# Patient Record
Sex: FEMALE | HISPANIC OR LATINO | Employment: STUDENT | URBAN - METROPOLITAN AREA
[De-identification: names, ages, dates, MRNs, and addresses within clinical notes are randomized per-mention and may not be internally consistent; named-entity substitution may affect disease eponyms.]

---
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
## 2024-03-07 NOTE — PROGRESS NOTES
SUBJECTIVE:                                                   Breanna Henley is a 18 year old female who presents to clinic today for the following health issue(s):  Patient presents with:  Vaginal Problem: Symptoms: Watery yellowish discharge, light odor.      HPI:  Has had BV in September and Dec 2023. Feels she had symptoms last week, but resolved with boric acid for 3 nights. Symptoms include watery yellow discharge and odor when she has BV. No symptoms today.   Notes she had a new partner when she first started getting BV.     Started OCP due to low iron from heavy periods. Would like to check iron levels.     No new partners since 12/2023 STI screening.     Patient's last menstrual period was 02/13/2024..     Patient is not sexually active, No obstetric history on file..  Using oral contraceptives for contraception.    reports that she has never smoked. She has never used smokeless tobacco.    STD testing offered?  Declined    Health maintenance updated:  yes    Overdue          Never done YEARLY PREVENTIVE VISIT (Yearly)     Never done CHLAMYDIA SCREENING (Yearly)     Never done ADVANCE CARE PLANNING (Every 5 Years)     Never done HIV SCREENING (Once)     Never done HEPATITIS C SCREENING (Once)     SEP 14   2023 COVID-19 Vaccine (4 - 2023-24 season)  Last completed: Jul 20, 2023     Never done PHQ-2 (once per calendar year) (Yearly, January to December)       Today's PHQ-2 Score:       3/11/2024     8:07 AM   PHQ-2 ( 1999 Pf[employer])   Q1: Little interest or pleasure in doing things 0   Q2: Feeling down, depressed or hopeless 0   PHQ-2 Score 0     Today's PHQ-9 Score:       3/11/2024     8:07 AM   PHQ-9 SCORE   PHQ-9 Total Score 0     Today's MARV-7 Score:       3/11/2024     8:07 AM   AMRV-7 SCORE   Total Score 4       Problem list and histories reviewed & adjusted, as indicated.  Additional history: as documented.    There is no problem list on file for this patient.    History reviewed. No pertinent  "surgical history.   Social History     Tobacco Use    Smoking status: Never    Smokeless tobacco: Never   Substance Use Topics    Alcohol use: Never      Problem (# of Occurrences) Relation (Name,Age of Onset)    Diabetes (1) Paternal Grandfather    Ovarian Cancer (1) Maternal Great-Grandmother    Colon Cancer (1) Maternal Grandfather              Current Outpatient Medications   Medication Sig    DAYSEE 0.15-0.03 &0.01 MG tablet Take 1 tablet by mouth daily    tretinoin (RETIN-A) 0.025 % external cream Apply topically at bedtime PRN     No current facility-administered medications for this visit.     Allergies   Allergen Reactions    Seasonal Allergies          OBJECTIVE:     /66 (BP Location: Right arm)   Ht 1.6 m (5' 3\")   Wt 65.3 kg (144 lb)   LMP 02/13/2024   BMI 25.51 kg/m    Body mass index is 25.51 kg/m .    Exam:  Constitutional:  Appearance: Well nourished, well developed alert, in no acute distress  Neurologic:  Mental Status:  Oriented X3.  Normal strength and tone, sensory exam grossly normal, mentation intact and speech normal.       In-Clinic Test Results:  No results found for this or any previous visit (from the past 24 hour(s)).    ASSESSMENT/PLAN:                                                        ICD-10-CM    1. Bacterial vaginosis  N76.0     B96.89       2. Iron deficiency anemia, unspecified iron deficiency anemia type  D50.9 IRON     IRON AND IRON BINDING CAPACITY     FERRITIN     IRON AND IRON BINDING CAPACITY     FERRITIN     CANCELED: IRON          Recurrent BV  -no symptoms today  -discussed boric acid regimen  -declines STI screening  -return if symptoms recur     Iron Deficiency Anemia  -started OCP due to menorrhagia 3 months ago  -sent in iron studies      Counseling:  Avoid vulvar irritants and allergens.  Use mild soaps, but avoid use on the vulva.  Cleanse the vulva with water only.  Gently pat the vulva dry after bathing.  Apply preservative-free, unscented or " fragrance-free emollient to hold moisture in the skin and improve barrier function.  Use PeriCare bottle to rinse after urination.  Use 100% cotton, unscented or fragrance-free menstrual pads.  Use adequate lubrication for intercourse      Common Vulvar Irritants and Allergens  Adult or baby wipes  Antiseptics (eg, povidone iodine, hexachlorophene)  Body fluids (eg, semen or saliva)  Colored or scented toilet paper  Condoms (lubricant or spermicide containing)  Contraceptive creams, jellies, foams, nonoxynol-9  Personal lubricants  Dyes (eg, chemically treated clothing, hygiene products, perfume)  Synthetic nylon underwear and tight clothing or undergarments  Emollients (eg, lanolin, jojoba oil, glycerin)  Laundry detergents, fabric softeners, and dryer sheets  Shaving cream and shaving (in general)  Rubber products (including latex)  Sanitary products, including tampons and pads  Soaps, bubble bath, bath salts, shampoos, and conditioners  Tea tree oil    Topical agents:  anesthetics (eg, benzocaine, lidocaine, dibucaine)  antibacterials (eg, neomycin, bacitracin, polymyxin)  antimycotics (eg, imidazoles, nystatin)  corticosteroids  medications, including trichloroacetic acid, 5-fluorouracil, podofilox, or podophyllin  Vaginal hygiene products, including vaginal sprays, washes, douches, perfumes, and deodorants      MIN Puentes [facility] [place]

## 2024-03-11 ENCOUNTER — OFFICE VISIT (OUTPATIENT)
Dept: OBGYN | Facility: CLINIC | Age: 19
End: 2024-03-11
Payer: COMMERCIAL

## 2024-03-11 VITALS
SYSTOLIC BLOOD PRESSURE: 104 MMHG | DIASTOLIC BLOOD PRESSURE: 66 MMHG | BODY MASS INDEX: 25.52 KG/M2 | HEIGHT: 63 IN | WEIGHT: 144 LBS

## 2024-03-11 DIAGNOSIS — N76.0 BACTERIAL VAGINOSIS: Primary | ICD-10-CM

## 2024-03-11 DIAGNOSIS — D50.9 IRON DEFICIENCY ANEMIA, UNSPECIFIED IRON DEFICIENCY ANEMIA TYPE: ICD-10-CM

## 2024-03-11 DIAGNOSIS — B96.89 BACTERIAL VAGINOSIS: Primary | ICD-10-CM

## 2024-03-11 LAB
FERRITIN SERPL-MCNC: 8 NG/ML (ref 6–175)
IRON BINDING CAPACITY (ROCHE): 531 UG/DL (ref 240–430)
IRON SATN MFR SERPL: 3 % (ref 15–46)
IRON SERPL-MCNC: 15 UG/DL (ref 37–145)

## 2024-03-11 PROCEDURE — 82728 ASSAY OF FERRITIN: CPT

## 2024-03-11 PROCEDURE — 99203 OFFICE O/P NEW LOW 30 MIN: CPT

## 2024-03-11 PROCEDURE — 83550 IRON BINDING TEST: CPT

## 2024-03-11 PROCEDURE — 36415 COLL VENOUS BLD VENIPUNCTURE: CPT

## 2024-03-11 PROCEDURE — 83540 ASSAY OF IRON: CPT

## 2024-03-11 RX ORDER — TRETINOIN 0.25 MG/G
CREAM TOPICAL AT BEDTIME
COMMUNITY
Start: 2022-05-12

## 2024-03-11 RX ORDER — LEVONORGESTREL AND ETHINYL ESTRADIOL 150-30(84)
1 KIT ORAL DAILY
COMMUNITY

## 2024-03-11 ASSESSMENT — ANXIETY QUESTIONNAIRES
GAD7 TOTAL SCORE: 4
5. BEING SO RESTLESS THAT IT IS HARD TO SIT STILL: NOT AT ALL
6. BECOMING EASILY ANNOYED OR IRRITABLE: SEVERAL DAYS
1. FEELING NERVOUS, ANXIOUS, OR ON EDGE: SEVERAL DAYS
GAD7 TOTAL SCORE: 4
7. FEELING AFRAID AS IF SOMETHING AWFUL MIGHT HAPPEN: NOT AT ALL
2. NOT BEING ABLE TO STOP OR CONTROL WORRYING: SEVERAL DAYS
3. WORRYING TOO MUCH ABOUT DIFFERENT THINGS: SEVERAL DAYS
IF YOU CHECKED OFF ANY PROBLEMS ON THIS QUESTIONNAIRE, HOW DIFFICULT HAVE THESE PROBLEMS MADE IT FOR YOU TO DO YOUR WORK, TAKE CARE OF THINGS AT HOME, OR GET ALONG WITH OTHER PEOPLE: SOMEWHAT DIFFICULT

## 2024-03-11 ASSESSMENT — PATIENT HEALTH QUESTIONNAIRE - PHQ9
5. POOR APPETITE OR OVEREATING: NOT AT ALL
SUM OF ALL RESPONSES TO PHQ QUESTIONS 1-9: 0

## 2024-03-11 NOTE — PATIENT INSTRUCTIONS
Avoid vulvar irritants and allergens.  Use mild soaps, but avoid use on the vulva.  Cleanse the vulva with water only.  Gently pat the vulva dry after bathing.  Apply preservative-free, unscented or fragrance-free emollient to hold moisture in the skin and improve barrier function.  Use PeriCare bottle to rinse after urination.  Use 100% cotton, unscented or fragrance-free menstrual pads.  Use adequate lubrication for intercourse      Common Vulvar Irritants and Allergens  Adult or baby wipes  Antiseptics (eg, povidone iodine, hexachlorophene)  Body fluids (eg, semen or saliva)  Colored or scented toilet paper  Condoms (lubricant or spermicide containing)  Contraceptive creams, jellies, foams, nonoxynol-9  Personal lubricants  Dyes (eg, chemically treated clothing, hygiene products, perfume)  Synthetic nylon underwear and tight clothing or undergarments  Emollients (eg, lanolin, jojoba oil, glycerin)  Laundry detergents, fabric softeners, and dryer sheets  Shaving cream and shaving (in general)  Rubber products (including latex)  Sanitary products, including tampons and pads  Soaps, bubble bath, bath salts, shampoos, and conditioners  Tea tree oil    Topical agents:  anesthetics (eg, benzocaine, lidocaine, dibucaine)  antibacterials (eg, neomycin, bacitracin, polymyxin)  antimycotics (eg, imidazoles, nystatin)  corticosteroids  medications, including trichloroacetic acid, 5-fluorouracil, podofilox, or podophyllin  Vaginal hygiene products, including vaginal sprays, washes, douches, perfumes, and deodorants

## 2024-06-06 ENCOUNTER — OFFICE VISIT (OUTPATIENT)
Dept: URGENT CARE | Facility: URGENT CARE | Age: 19
End: 2024-06-06
Payer: COMMERCIAL

## 2024-06-06 VITALS
TEMPERATURE: 98.6 F | DIASTOLIC BLOOD PRESSURE: 83 MMHG | BODY MASS INDEX: 25.69 KG/M2 | SYSTOLIC BLOOD PRESSURE: 129 MMHG | OXYGEN SATURATION: 98 % | HEART RATE: 104 BPM | WEIGHT: 145 LBS

## 2024-06-06 DIAGNOSIS — N76.0 BV (BACTERIAL VAGINOSIS): ICD-10-CM

## 2024-06-06 DIAGNOSIS — J02.9 SORE THROAT: Primary | ICD-10-CM

## 2024-06-06 DIAGNOSIS — B96.89 BV (BACTERIAL VAGINOSIS): ICD-10-CM

## 2024-06-06 DIAGNOSIS — N30.01 ACUTE CYSTITIS WITH HEMATURIA: ICD-10-CM

## 2024-06-06 LAB
ALBUMIN UR-MCNC: 30 MG/DL
APPEARANCE UR: CLEAR
BACTERIA #/AREA URNS HPF: ABNORMAL /HPF
BILIRUB UR QL STRIP: NEGATIVE
CLUE CELLS: PRESENT
COLOR UR AUTO: YELLOW
DEPRECATED S PYO AG THROAT QL EIA: NEGATIVE
GLUCOSE UR STRIP-MCNC: NEGATIVE MG/DL
GROUP A STREP BY PCR: NOT DETECTED
HGB UR QL STRIP: ABNORMAL
KETONES UR STRIP-MCNC: NEGATIVE MG/DL
LEUKOCYTE ESTERASE UR QL STRIP: ABNORMAL
NITRATE UR QL: NEGATIVE
PH UR STRIP: 6 [PH] (ref 5–7)
RBC #/AREA URNS AUTO: ABNORMAL /HPF
SP GR UR STRIP: >=1.03 (ref 1–1.03)
SQUAMOUS #/AREA URNS AUTO: ABNORMAL /LPF
TRICHOMONAS, WET PREP: ABNORMAL
UROBILINOGEN UR STRIP-ACNC: 0.2 E.U./DL
WBC #/AREA URNS AUTO: ABNORMAL /HPF
WBC'S/HIGH POWER FIELD, WET PREP: ABNORMAL
YEAST, WET PREP: ABNORMAL

## 2024-06-06 PROCEDURE — 81001 URINALYSIS AUTO W/SCOPE: CPT | Performed by: PHYSICIAN ASSISTANT

## 2024-06-06 PROCEDURE — 99204 OFFICE O/P NEW MOD 45 MIN: CPT | Performed by: PHYSICIAN ASSISTANT

## 2024-06-06 PROCEDURE — 87086 URINE CULTURE/COLONY COUNT: CPT | Performed by: PHYSICIAN ASSISTANT

## 2024-06-06 PROCEDURE — 87651 STREP A DNA AMP PROBE: CPT | Performed by: PHYSICIAN ASSISTANT

## 2024-06-06 PROCEDURE — 87210 SMEAR WET MOUNT SALINE/INK: CPT | Performed by: PHYSICIAN ASSISTANT

## 2024-06-06 RX ORDER — FLUCONAZOLE 150 MG/1
150 TABLET ORAL
Qty: 3 TABLET | Refills: 0 | Status: SHIPPED | OUTPATIENT
Start: 2024-06-06 | End: 2024-06-13

## 2024-06-06 RX ORDER — METRONIDAZOLE 500 MG/1
500 TABLET ORAL 2 TIMES DAILY
Qty: 14 TABLET | Refills: 0 | Status: SHIPPED | OUTPATIENT
Start: 2024-06-06 | End: 2024-06-13

## 2024-06-06 RX ORDER — CEFDINIR 300 MG/1
300 CAPSULE ORAL 2 TIMES DAILY
Qty: 14 CAPSULE | Refills: 0 | Status: SHIPPED | OUTPATIENT
Start: 2024-06-06 | End: 2024-06-13

## 2024-06-06 ASSESSMENT — ENCOUNTER SYMPTOMS
SORE THROAT: 1
DYSURIA: 1
FEVER: 0

## 2024-06-06 NOTE — LETTER
June 6, 2024      Breanna Henley  [address]   [place] [address]        To Whom It May Concern:    Breanna Henley was seen in our clinic. She may return to work without restrictions.      Sincerely,        LEEROY Duran

## 2024-06-06 NOTE — PROGRESS NOTES
SUBJECTIVE:   Breanna Henley is a 18 year old female presenting with a chief complaint of   Chief Complaint   Patient presents with    Urgent Care     Frequency, urgency, mild burning sensation, urinary hesitancy worsened today- sx began 2 weeks ago intermittent   Sore throat, congestion, HA x Monday        She is a new patient of [facility].  Patient presents with urinary small output that started today.  States started BCP on January.  Spotting x 1 month.      Treatment:  theraflu    Review of Systems   Constitutional:  Negative for fever.   HENT:  Positive for congestion and sore throat. Negative for ear pain.    Genitourinary:  Positive for decreased urine volume and dysuria.   All other systems reviewed and are negative.      History reviewed. No pertinent past medical history.  Family History   Problem Relation Age of Onset    Colon Cancer Maternal Grandfather     Diabetes Paternal Grandfather     Ovarian Cancer Maternal Great-Grandmother      Current Outpatient Medications   Medication Sig Dispense Refill    cefdinir (OMNICEF) 300 MG capsule Take 1 capsule (300 mg) by mouth 2 times daily for 7 days 14 capsule 0    fluconazole (DIFLUCAN) 150 MG tablet Take 1 tablet (150 mg) by mouth every 3 days for 3 doses 3 tablet 0    metroNIDAZOLE (FLAGYL) 500 MG tablet Take 1 tablet (500 mg) by mouth 2 times daily for 7 days 14 tablet 0    DAYSEE 0.15-0.03 &0.01 MG tablet Take 1 tablet by mouth daily      tretinoin (RETIN-A) 0.025 % external cream Apply topically at bedtime PRN       Social History     Tobacco Use    Smoking status: Never    Smokeless tobacco: Never   Substance Use Topics    Alcohol use: Never       OBJECTIVE  /83   Pulse 104   Temp 98.6  F (37  C) (Tympanic)   Wt 65.8 kg (145 lb)   SpO2 98%   BMI 25.69 kg/m      Physical Exam  Vitals and nursing note reviewed.   Constitutional:       General: She is not in acute distress.     Appearance: Normal appearance. She is obese. She is not  ill-appearing.   HENT:      Head: Normocephalic and atraumatic.      Right Ear: Tympanic membrane, ear canal and external ear normal.      Left Ear: Tympanic membrane, ear canal and external ear normal.      Nose: Nose normal.      Mouth/Throat:      Mouth: Mucous membranes are moist.      Pharynx: Oropharynx is clear.   Eyes:      Extraocular Movements: Extraocular movements intact.      Conjunctiva/sclera: Conjunctivae normal.   Cardiovascular:      Rate and Rhythm: Normal rate and regular rhythm.      Pulses: Normal pulses.      Heart sounds: Normal heart sounds.   Pulmonary:      Effort: Pulmonary effort is normal.      Breath sounds: Normal breath sounds.   Abdominal:      Tenderness: There is no right CVA tenderness or left CVA tenderness.   Musculoskeletal:      Cervical back: Normal range of motion.   Skin:     General: Skin is warm and dry.      Findings: No rash.   Neurological:      General: No focal deficit present.      Mental Status: She is alert.   Psychiatric:         Mood and Affect: Mood normal.         Behavior: Behavior normal.         Labs:  Results for orders placed or performed in visit on 06/06/24 (from the past 24 hour(s))   UA Macroscopic with reflex to Microscopic and Culture - Clinic Collect    Specimen: Urine, Clean Catch   Result Value Ref Range    Color Urine Yellow Colorless, Straw, Light Yellow, Yellow    Appearance Urine Clear Clear    Glucose Urine Negative Negative mg/dL    Bilirubin Urine Negative Negative    Ketones Urine Negative Negative mg/dL    Specific Gravity Urine >=1.030 1.003 - 1.035    Blood Urine Large (A) Negative    pH Urine 6.0 5.0 - 7.0    Protein Albumin Urine 30 (A) Negative mg/dL    Urobilinogen Urine 0.2 0.2, 1.0 E.U./dL    Nitrite Urine Negative Negative    Leukocyte Esterase Urine Small (A) Negative   Streptococcus A Rapid Screen w/Reflex to PCR - Clinic Collect    Specimen: Throat; Swab   Result Value Ref Range    Group A Strep antigen Negative Negative    UA Microscopic with Reflex to Culture   Result Value Ref Range    Bacteria Urine Few (A) None Seen /HPF    RBC Urine  (A) 0-2 /HPF /HPF    WBC Urine 10-25 (A) 0-5 /HPF /HPF    Squamous Epithelials Urine Moderate (A) None Seen /LPF   Wet prep - Clinic Collect    Specimen: Vagina; Swab   Result Value Ref Range    Trichomonas Absent Absent    Yeast Absent Absent    Clue Cells Present (A) Absent    WBCs/high power field 2+ (A) None       ASSESSMENT:      ICD-10-CM    1. Sore throat  J02.9 Streptococcus A Rapid Screen w/Reflex to PCR - Clinic Collect     Group A Streptococcus PCR Throat Swab      2. Acute cystitis with hematuria  N30.01 cefdinir (OMNICEF) 300 MG capsule     fluconazole (DIFLUCAN) 150 MG tablet      3. BV (bacterial vaginosis)  N76.0 metroNIDAZOLE (FLAGYL) 500 MG tablet    B96.89 fluconazole (DIFLUCAN) 150 MG tablet           Medical Decision Making:    Differential Diagnosis:  URI Adult/Peds:  Strep pharyngitis, Viral pharyngitis, Viral syndrome, and Viral upper respiratory illness  UTI: UTI, Dysuria, Pyelonephritis, Kidney Stone, Urethritis, and Vaginitis    Serious Comorbid Conditions:  Adult:   reviewed    PLAN:    Tylenol/motrin as needed.  Drink plenty of water.  Gargle with salt water.  May use chloraseptic spray as directed for ST.  Strep pcr pending.  Rx for omnicef and flagyl.  Patient states she gets yeast infections with abx so Rx for diflucan.  No etoh.  Urine culture pending.  Increase fluid intake. Discussed reasons to seek immediate medical attention - specifically, fevers or vomiting.  Finish all medications.        Followup:    If not improving or if condition worsens, follow up with your Primary Care Provider, If not improving or if conditions worsens over the next 12-24 hours, go to the Emergency Department    There are no Patient Instructions on file for this visit.

## 2024-06-08 LAB — BACTERIA UR CULT: NORMAL
